# Patient Record
Sex: MALE | Race: WHITE
[De-identification: names, ages, dates, MRNs, and addresses within clinical notes are randomized per-mention and may not be internally consistent; named-entity substitution may affect disease eponyms.]

---

## 2021-07-12 ENCOUNTER — APPOINTMENT (OUTPATIENT)
Dept: ORTHOPEDIC SURGERY | Facility: CLINIC | Age: 31
End: 2021-07-12
Payer: COMMERCIAL

## 2021-07-12 VITALS
WEIGHT: 155 LBS | HEART RATE: 65 BPM | SYSTOLIC BLOOD PRESSURE: 123 MMHG | DIASTOLIC BLOOD PRESSURE: 77 MMHG | HEIGHT: 69 IN | BODY MASS INDEX: 22.96 KG/M2

## 2021-07-12 DIAGNOSIS — F17.200 NICOTINE DEPENDENCE, UNSPECIFIED, UNCOMPLICATED: ICD-10-CM

## 2021-07-12 DIAGNOSIS — Z78.9 OTHER SPECIFIED HEALTH STATUS: ICD-10-CM

## 2021-07-12 PROCEDURE — 99072 ADDL SUPL MATRL&STAF TM PHE: CPT

## 2021-07-12 PROCEDURE — 99203 OFFICE O/P NEW LOW 30 MIN: CPT

## 2021-07-12 PROCEDURE — 73110 X-RAY EXAM OF WRIST: CPT | Mod: LT

## 2021-07-12 NOTE — DISCUSSION/SUMMARY
[de-identified] : The patient had an injury to his left wrist.  There is no evidence of fracture or dislocation.  He had an MRI which apparently demonstrated a cartilage tear.  He had temporary relief from the steroid injection but the pain has returned.  He is interested in definitive treatment.  He is referred to the hand service for consultation.

## 2021-07-12 NOTE — PHYSICAL EXAM
[Normal] : Gait: normal [Rad] : radial 2+ and symmetric bilaterally [de-identified] : The patient has no respiratory distress. Mood and affect are normal. The patient is alert and oriented to person, place and time.\par Examination of the cervical spine demonstrates no tenderness, no deformity and no muscle spasm. Cervical spine rotation is 60° to the right, 60° to the left, 75° of extension and 45° of flexion. Neurologic exam of the upper extremities reveals intact sensation to light touch. Motor function is 5 over 5 in all groups. Deep tendon reflexes are 2+ and equal at the biceps, triceps and brachioradialis.\par There is no pain with motion of the shoulders.  There is no pain with motion of the elbows.  Both shoulders are stable.  Both elbows are stable.  Examination of the left wrist and hand demonstrates ulnar sided tenderness at the left wrist.  There is mild pain with wrist motion.  Durkan's test is negative.  Phalen test is negative.  Finkelstein test is negative.  Tendon function is intact.  There is no triggering.  There is ulnar-sided pain with forearm rotation.  The skin is intact.  There is no lymphedema. [de-identified] : AP, lateral and oblique x-rays of the left wrist taken today demonstrate no fracture, no dislocation and no bony abnormality.

## 2021-07-12 NOTE — HISTORY OF PRESENT ILLNESS
[de-identified] : 31 year old RHD male  presents with left ulnar sided wrist pain x 1.5 years. He describes an insidious onset of pain without injury or trauma. He was evaluated by an outside physician last year, had xray and MRI and was told there was an injury to his cartilage. He was treated with a cortisone injection which helped for 4-5 months. The pain has gradually returned and is intermittent. He has pain when golfing but is able to play through the pain.

## 2021-08-16 ENCOUNTER — APPOINTMENT (OUTPATIENT)
Dept: ORTHOPEDIC SURGERY | Facility: CLINIC | Age: 31
End: 2021-08-16
Payer: COMMERCIAL

## 2021-08-16 VITALS
WEIGHT: 155 LBS | HEART RATE: 67 BPM | BODY MASS INDEX: 22.96 KG/M2 | DIASTOLIC BLOOD PRESSURE: 77 MMHG | SYSTOLIC BLOOD PRESSURE: 130 MMHG | OXYGEN SATURATION: 98 % | HEIGHT: 69 IN

## 2021-08-16 DIAGNOSIS — M25.532 PAIN IN LEFT WRIST: ICD-10-CM

## 2021-08-16 PROCEDURE — 99214 OFFICE O/P EST MOD 30 MIN: CPT

## 2021-08-16 PROCEDURE — SPL02: CPT | Mod: 25

## 2021-08-16 PROCEDURE — 73100 X-RAY EXAM OF WRIST: CPT | Mod: 50

## 2021-08-17 PROBLEM — M25.532 LEFT WRIST PAIN: Status: ACTIVE | Noted: 2021-07-12

## 2023-01-11 ENCOUNTER — NON-APPOINTMENT (OUTPATIENT)
Age: 33
End: 2023-01-11

## 2023-08-15 ENCOUNTER — NON-APPOINTMENT (OUTPATIENT)
Age: 33
End: 2023-08-15